# Patient Record
Sex: MALE | Race: WHITE | NOT HISPANIC OR LATINO
[De-identification: names, ages, dates, MRNs, and addresses within clinical notes are randomized per-mention and may not be internally consistent; named-entity substitution may affect disease eponyms.]

---

## 2022-07-17 ENCOUNTER — APPOINTMENT (OUTPATIENT)
Dept: ORTHOPEDIC SURGERY | Facility: CLINIC | Age: 42
End: 2022-07-17

## 2022-07-17 VITALS — BODY MASS INDEX: 25.49 KG/M2 | WEIGHT: 205 LBS | HEIGHT: 75 IN

## 2022-07-17 DIAGNOSIS — M25.522 PAIN IN LEFT ELBOW: ICD-10-CM

## 2022-07-17 PROBLEM — Z00.00 ENCOUNTER FOR PREVENTIVE HEALTH EXAMINATION: Status: ACTIVE | Noted: 2022-07-17

## 2022-07-17 PROCEDURE — 99204 OFFICE O/P NEW MOD 45 MIN: CPT

## 2022-07-17 PROCEDURE — 73080 X-RAY EXAM OF ELBOW: CPT | Mod: LT

## 2022-07-17 RX ORDER — CELECOXIB 200 MG/1
200 CAPSULE ORAL TWICE DAILY
Qty: 60 | Refills: 1 | Status: ACTIVE | COMMUNITY
Start: 2022-07-17 | End: 1900-01-01
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
## 2022-07-17 NOTE — ASSESSMENT
[FreeTextEntry1] : Celebrex 200 mg 1-2/d prn pain.\par Sling to the LUE x 3 days.\par RTO in 1 week for xray and examination with Dr. Mccann.

## 2022-07-17 NOTE — IMAGING
[de-identified] : Left elbow with minimal swelling.\par There is ttp directly over the radial head.\par Ligamentous laxity is not assessed due to pain. \par Strength is not assessed due to pain.\par  and intrinsic strength is 4/5 due to guarding.\par All digits are NVI with FAROM. \par There is no ttp over the left hand/wrist/forearm.

## 2022-07-28 ENCOUNTER — APPOINTMENT (OUTPATIENT)
Dept: ORTHOPEDIC SURGERY | Facility: CLINIC | Age: 42
End: 2022-07-28

## 2022-07-28 VITALS — WEIGHT: 205 LBS | HEIGHT: 75 IN | BODY MASS INDEX: 25.49 KG/M2

## 2022-07-28 DIAGNOSIS — S63.502A UNSPECIFIED SPRAIN OF LEFT WRIST, INITIAL ENCOUNTER: ICD-10-CM

## 2022-07-28 PROCEDURE — 24650 CLTX RDL HEAD/NCK FX WO MNPJ: CPT

## 2022-07-28 PROCEDURE — 99204 OFFICE O/P NEW MOD 45 MIN: CPT | Mod: 57

## 2022-07-29 PROBLEM — S63.502A SPRAIN OF LEFT WRIST, INITIAL ENCOUNTER: Status: ACTIVE | Noted: 2022-07-29

## 2022-07-29 NOTE — PHYSICAL EXAM
[de-identified] : L elbow\par Tender RH\par Decreased pro/supination\par \par xrays RH fracture \par \par L wrist\par Nontender scphoid, DR\par Good flex/ext\par No swelling\par \par Xrays normal
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
## 2022-08-11 ENCOUNTER — APPOINTMENT (OUTPATIENT)
Dept: ORTHOPEDIC SURGERY | Facility: CLINIC | Age: 42
End: 2022-08-11

## 2022-08-11 VITALS — WEIGHT: 205 LBS | HEIGHT: 75 IN | BODY MASS INDEX: 25.49 KG/M2

## 2022-08-11 DIAGNOSIS — S52.125A NONDISPLACED FRACTURE OF HEAD OF LEFT RADIUS, INITIAL ENCOUNTER FOR CLOSED FRACTURE: ICD-10-CM

## 2022-08-11 PROCEDURE — 73080 X-RAY EXAM OF ELBOW: CPT | Mod: LT

## 2022-08-11 PROCEDURE — 99024 POSTOP FOLLOW-UP VISIT: CPT

## 2022-08-11 NOTE — HISTORY OF PRESENT ILLNESS
[2] : 2 [1] : 2 [Dull/Aching] : dull/aching [de-identified] : L RH fracture \par He feels much better  [FreeTextEntry1] : left arm [FreeTextEntry5] : pain is better\par  [de-identified] : no

## 2022-08-11 NOTE — PHYSICAL EXAM
[de-identified] : L Elbow\par Almost FROM \par Nontender\par No swelling\par \par Xrays healing fx

## 2024-09-30 NOTE — REVIEW OF SYSTEMS
Spoke with pt's wife and got him scheduled 10/2 at 8:45 for a covid and flu shot.   [Negative] : Heme/Lymph